# Patient Record
(demographics unavailable — no encounter records)

---

## 2024-10-28 NOTE — PHYSICAL EXAM
[de-identified] : Gen: NAD Resp: Nonlabored respirations, no SOB Gait: Nl   L Knee: Skin intact No effusion 0-130 AROM Tender MJL 5/5 IP Q EHL TA GS SILT L3-S1 2+ dp pt wwp bcr   1A lachman and (-) pivot shift Grade 1 posterior drawer Stable to v/v at 0 and 30 degrees (-) Dial test at 30, 90 degrees   (+) Kristi, [place]ss[place] Medial joint pain with shallow 2 leg squat   1+ patellar translation (-) pain with patellar compression/grind (-) J sign (-) apprehension  [de-identified] : MRI [facility] - lm tear small flap, extrusion, mild lat oa

## 2024-10-28 NOTE — DISCUSSION/SUMMARY
[de-identified] : 71yF pw lm tear, moderate lat OA.  The patient was extensively counseled on treatment options including but not limited to observation, rest/activity modification, bracing, anti-inflammatory medications, physical therapy, injections, and surgery.  The natural history of the disease was thoroughly explained.  We discussed that the majority of the time, this condition can be initially treated conservatively. Pt wants to discuss surgery but symptoms are mild currently - rec conservative tx. The patient will proceed with: -PT -diclofenac rx provided - pt instructed to take with meals and not with other nsaid's including advil, aleve, and toradol.  The pt understands to stop taking the medication if any stomach sx develop or they develop any type of bleeding or bruising, at which point they will present to their PMD -pt was instructed on the importance of resting, icing and elevating to minimize swelling -RTC 2-3m   I have personally obtained the history, reviewed the ROS as noted, and performed the physical examination today.  The patient and I discussed the assessment and options and developed the plan.  All questions were answered and the patient stated their understanding of the treatment plan and appreciation of the visit.   My cumulative time spent on this patient's visit included: Preparation for the visit, review of the medical records, review of pertinent diagnostic studies, examination and counseling of the patient on the above diagnosis, treatment plan and prognosis, orders of diagnostic tests, medications and/or appropriate procedures and documentation in the medical records of today's visit.   James Mathur MD

## 2024-10-28 NOTE — HISTORY OF PRESENT ILLNESS
[de-identified] : 71y healthy F pw L knee pain.  She presents with knee pain after a twisting injury.  Pt was doing bridal shower yoga when they made an aggressive pivot and felt a strain on the inside of their knee.  They have tried NSAIDs and activity modification without significant relief.  They note catching/clicking/locking/buckling with the locking so severe they manually bend/extend the knee until it clicks back into place.  They deny any overt swelling and any numbness/paresthesias.  The mechanical symptoms have been very concerning to them and they wish to understand the diagnosis and discuss possible treatment options.  6/10 medial knee pain without radiation Going to [facility] in 1w.  Mom of pacu ANNE Flores.  9/16 - good relief from inj and walking in [place] - noted painful popping getting in and out of cars, brings MRI to this visit  10/28 - she has minimal pain but occasional locking, has not started PT

## 2025-01-23 NOTE — DISCUSSION/SUMMARY
[de-identified] : 71yF pw lm tear, moderate lat OA.  The patient was extensively counseled on treatment options including but not limited to observation, rest/activity modification, bracing, anti-inflammatory medications, physical therapy, injections, and surgery.  The natural history of the disease was thoroughly explained.  We discussed that the majority of the time, this condition can be initially treated conservatively. Pt wants to discuss surgery but symptoms are mild currently - rec conservative tx. The patient will proceed with: -PT -diclofenac rx provided - pt instructed to take with meals and not with other nsaid's including advil, aleve, and toradol.  The pt understands to stop taking the medication if any stomach sx develop or they develop any type of bleeding or bruising, at which point they will present to their PMD -pt was instructed on the importance of resting, icing and elevating to minimize swelling -RTC 3m   I have personally obtained the history, reviewed the ROS as noted, and performed the physical examination today.  The patient and I discussed the assessment and options and developed the plan.  All questions were answered and the patient stated their understanding of the treatment plan and appreciation of the visit.   My cumulative time spent on this patient's visit included: Preparation for the visit, review of the medical records, review of pertinent diagnostic studies, examination and counseling of the patient on the above diagnosis, treatment plan and prognosis, orders of diagnostic tests, medications and/or appropriate procedures and documentation in the medical records of today's visit.   James Mathur MD

## 2025-01-23 NOTE — HISTORY OF PRESENT ILLNESS
[de-identified] : 71y healthy F pw L knee pain.  She presents with knee pain after a twisting injury.  Pt was doing bridal shower yoga when they made an aggressive pivot and felt a strain on the inside of their knee.  They have tried NSAIDs and activity modification without significant relief.  They note catching/clicking/locking/buckling with the locking so severe they manually bend/extend the knee until it clicks back into place.  They deny any overt swelling and any numbness/paresthesias.  The mechanical symptoms have been very concerning to them and they wish to understand the diagnosis and discuss possible treatment options.  6/10 medial knee pain without radiation Going to [facility] in 1w.  Mom of pacu ANNE Flores.  9/16 - good relief from inj and walking in [place] - noted painful popping getting in and out of cars, brings MRI to this visit  10/28 - she has minimal pain but occasional locking, has not started PT   1/23 - occasional clicking/locking around kneecap, 3/10 pain, doing well overall

## 2025-01-23 NOTE — PHYSICAL EXAM
[de-identified] : Gen: NAD Resp: Nonlabored respirations, no SOB Gait: Nl   L Knee: Skin intact No effusion 0-130 AROM Tender MJL 5/5 IP Q EHL TA GS SILT L3-S1 2+ dp pt wwp bcr   1A lachman and (-) pivot shift Grade 1 posterior drawer Stable to v/v at 0 and 30 degrees (-) Dial test at 30, 90 degrees   (+) Kristi, [place]ss[place] Medial joint pain with shallow 2 leg squat   1+ patellar translation (-) pain with patellar compression/grind (-) J sign (-) apprehension  [de-identified] : MRI [facility] - lm tear small flap, extrusion, mild lat oa

## 2025-04-24 NOTE — PHYSICAL EXAM
[de-identified] : Gen: NAD Resp: Nonlabored respirations, no SOB Gait: Nl   L Knee: Skin intact No effusion 0-130 AROM Tender MJL 5/5 IP Q EHL TA GS SILT L3-S1 2+ dp pt wwp bcr   1A lachman and (-) pivot shift Grade 1 posterior drawer Stable to v/v at 0 and 30 degrees (-) Dial test at 30, 90 degrees   (+) Kristi, [place]ss[place] Medial joint pain with shallow 2 leg squat   1+ patellar translation (-) pain with patellar compression/grind (-) J sign (-) apprehension  [de-identified] : MRI [facility] - lm tear small flap, extrusion, mild lat oa

## 2025-04-24 NOTE — DISCUSSION/SUMMARY
[de-identified] : 71yF pw lm tear, moderate lat OA.  The patient was extensively counseled on treatment options including but not limited to observation, rest/activity modification, bracing, anti-inflammatory medications, physical therapy, injections, and surgery.  The natural history of the disease was thoroughly explained.  We discussed that the majority of the time, this condition can be initially treated conservatively. Pt wants to discuss surgery but symptoms are mild currently - rec conservative tx. The patient will proceed with: -PT -diclofenac rx provided - pt instructed to take with meals and not with other nsaid's including advil, aleve, and toradol.  The pt understands to stop taking the medication if any stomach sx develop or they develop any type of bleeding or bruising, at which point they will present to their PMD -pt was instructed on the importance of resting, icing and elevating to minimize swelling -RTC 3-6m   I have personally obtained the history, reviewed the ROS as noted, and performed the physical examination today.  The patient and I discussed the assessment and options and developed the plan.  All questions were answered and the patient stated their understanding of the treatment plan and appreciation of the visit.   My cumulative time spent on this patient's visit included: Preparation for the visit, review of the medical records, review of pertinent diagnostic studies, examination and counseling of the patient on the above diagnosis, treatment plan and prognosis, orders of diagnostic tests, medications and/or appropriate procedures and documentation in the medical records of today's visit.   James Mathur MD

## 2025-04-24 NOTE — HISTORY OF PRESENT ILLNESS
[de-identified] : 71y healthy F pw L knee pain.  She presents with knee pain after a twisting injury.  Pt was doing bridal shower yoga when they made an aggressive pivot and felt a strain on the inside of their knee.  They have tried NSAIDs and activity modification without significant relief.  They note catching/clicking/locking/buckling with the locking so severe they manually bend/extend the knee until it clicks back into place.  They deny any overt swelling and any numbness/paresthesias.  The mechanical symptoms have been very concerning to them and they wish to understand the diagnosis and discuss possible treatment options.  6/10 medial knee pain without radiation Going to [facility] in 1w.  Mom of pacu ANNE Flores.  9/16 - good relief from inj and walking in [place] - noted painful popping getting in and out of cars, brings MRI to this visit  10/28 - she has minimal pain but occasional locking, has not started PT   1/23 - occasional clicking/locking around kneecap, 3/10 pain, doing well overall  4/24 - no locking at this point, bl stiffness, benefitting from PT